# Patient Record
Sex: MALE | Race: WHITE | NOT HISPANIC OR LATINO | URBAN - METROPOLITAN AREA
[De-identification: names, ages, dates, MRNs, and addresses within clinical notes are randomized per-mention and may not be internally consistent; named-entity substitution may affect disease eponyms.]

---

## 2017-08-18 ENCOUNTER — APPOINTMENT (RX ONLY)
Dept: URBAN - METROPOLITAN AREA CLINIC 37 | Facility: CLINIC | Age: 32
Setting detail: DERMATOLOGY
End: 2017-08-18

## 2017-08-18 DIAGNOSIS — A63.0 ANOGENITAL (VENEREAL) WARTS: ICD-10-CM

## 2017-08-18 DIAGNOSIS — D22 MELANOCYTIC NEVI: ICD-10-CM

## 2017-08-18 DIAGNOSIS — L30.4 ERYTHEMA INTERTRIGO: ICD-10-CM

## 2017-08-18 PROBLEM — L70.0 ACNE VULGARIS: Status: ACTIVE | Noted: 2017-08-18

## 2017-08-18 PROBLEM — L20.84 INTRINSIC (ALLERGIC) ECZEMA: Status: ACTIVE | Noted: 2017-08-18

## 2017-08-18 PROBLEM — D22.5 MELANOCYTIC NEVI OF TRUNK: Status: ACTIVE | Noted: 2017-08-18

## 2017-08-18 PROCEDURE — 99213 OFFICE O/P EST LOW 20 MIN: CPT | Mod: 25

## 2017-08-18 PROCEDURE — 54056 CRYOSURGERY PENIS LESION(S): CPT

## 2017-08-18 RX ORDER — NYSTATIN 100000 [USP'U]/G
CREAM TOPICAL
Qty: 30 | Refills: 3 | Status: ERX | COMMUNITY
Start: 2017-08-18

## 2017-08-18 RX ADMIN — NYSTATIN: 100000 CREAM TOPICAL at 00:00

## 2017-08-18 ASSESSMENT — SEVERITY ASSESSMENT: SEVERITY: MILD

## 2017-08-18 NOTE — HPI: RASH
How Severe Is Your Rash?: moderate
Is This A New Presentation, Or A Follow-Up?: Rash
Additional History: Patient is using antifungal and it's helping .

## 2017-11-15 ENCOUNTER — APPOINTMENT (RX ONLY)
Dept: URBAN - METROPOLITAN AREA CLINIC 37 | Facility: CLINIC | Age: 32
Setting detail: DERMATOLOGY
End: 2017-11-15

## 2017-11-15 DIAGNOSIS — A63.0 ANOGENITAL (VENEREAL) WARTS: ICD-10-CM

## 2017-11-15 DIAGNOSIS — B07.8 OTHER VIRAL WARTS: ICD-10-CM

## 2017-11-15 PROBLEM — L70.0 ACNE VULGARIS: Status: ACTIVE | Noted: 2017-11-15

## 2017-11-15 PROBLEM — F41.9 ANXIETY DISORDER, UNSPECIFIED: Status: ACTIVE | Noted: 2017-11-15

## 2017-11-15 PROCEDURE — 17110 DESTRUCTION B9 LES UP TO 14: CPT

## 2017-11-15 PROCEDURE — 54056 CRYOSURGERY PENIS LESION(S): CPT

## 2019-01-28 ENCOUNTER — APPOINTMENT (RX ONLY)
Dept: URBAN - METROPOLITAN AREA CLINIC 37 | Facility: CLINIC | Age: 34
Setting detail: DERMATOLOGY
End: 2019-01-28

## 2019-01-28 DIAGNOSIS — A63.0 ANOGENITAL (VENEREAL) WARTS: ICD-10-CM

## 2019-01-28 PROCEDURE — 54056 CRYOSURGERY PENIS LESION(S): CPT
